# Patient Record
Sex: FEMALE | Race: NATIVE HAWAIIAN OR OTHER PACIFIC ISLANDER | Employment: FULL TIME | URBAN - METROPOLITAN AREA
[De-identification: names, ages, dates, MRNs, and addresses within clinical notes are randomized per-mention and may not be internally consistent; named-entity substitution may affect disease eponyms.]

---

## 2018-06-21 PROBLEM — R06.02 SOB (SHORTNESS OF BREATH): Status: ACTIVE | Noted: 2018-06-21

## 2018-06-21 PROBLEM — R00.2 PALPITATIONS: Status: ACTIVE | Noted: 2018-06-21

## 2018-06-21 PROBLEM — R42 DIZZY: Status: ACTIVE | Noted: 2018-06-21

## 2018-06-21 PROBLEM — D50.9 IRON DEFICIENCY ANEMIA: Status: ACTIVE | Noted: 2018-06-21

## 2020-04-16 PROBLEM — Z90.49 S/P LAPAROSCOPIC CHOLECYSTECTOMY: Status: ACTIVE | Noted: 2020-04-16

## 2020-04-16 PROBLEM — K81.0 ACUTE CHOLECYSTITIS: Status: ACTIVE | Noted: 2020-04-16

## 2023-08-02 PROCEDURE — 99284 EMERGENCY DEPT VISIT MOD MDM: CPT

## 2023-08-02 RX ORDER — LISDEXAMFETAMINE DIMESYLATE 30 MG/1
30 CAPSULE ORAL EVERY MORNING
COMMUNITY
Start: 2023-05-26

## 2023-08-03 ENCOUNTER — APPOINTMENT (OUTPATIENT)
Dept: CT IMAGING | Age: 33
End: 2023-08-03
Attending: EMERGENCY MEDICINE
Payer: COMMERCIAL

## 2023-08-03 ENCOUNTER — HOSPITAL ENCOUNTER (EMERGENCY)
Age: 33
Discharge: HOME | End: 2023-08-03
Attending: EMERGENCY MEDICINE
Payer: COMMERCIAL

## 2023-08-03 VITALS
OXYGEN SATURATION: 98 % | RESPIRATION RATE: 14 BRPM | TEMPERATURE: 98 F | SYSTOLIC BLOOD PRESSURE: 104 MMHG | DIASTOLIC BLOOD PRESSURE: 76 MMHG | HEIGHT: 63 IN | HEART RATE: 68 BPM | WEIGHT: 132 LBS | BODY MASS INDEX: 23.39 KG/M2

## 2023-08-03 DIAGNOSIS — S09.90XA ACUTE HEAD INJURY, INITIAL ENCOUNTER: Primary | ICD-10-CM

## 2023-08-03 PROCEDURE — 70450 CT HEAD/BRAIN W/O DYE: CPT | Performed by: EMERGENCY MEDICINE

## 2023-08-03 RX ORDER — ONDANSETRON 4 MG/1
4 TABLET, ORALLY DISINTEGRATING ORAL EVERY 4 HOURS PRN
Qty: 10 TABLET | Refills: 0 | Status: SHIPPED | OUTPATIENT
Start: 2023-08-03 | End: 2023-08-10
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
## 2024-06-09 ENCOUNTER — HOSPITAL ENCOUNTER (EMERGENCY)
Age: 34
Discharge: HOME | End: 2024-06-09
Attending: EMERGENCY MEDICINE
Payer: MEDICAID

## 2024-06-09 ENCOUNTER — APPOINTMENT (OUTPATIENT)
Dept: GENERAL RADIOLOGY | Age: 34
End: 2024-06-09
Payer: MEDICAID

## 2024-06-09 VITALS
SYSTOLIC BLOOD PRESSURE: 110 MMHG | DIASTOLIC BLOOD PRESSURE: 73 MMHG | OXYGEN SATURATION: 96 % | TEMPERATURE: 98 F | BODY MASS INDEX: 25.16 KG/M2 | HEIGHT: 63 IN | WEIGHT: 142 LBS | RESPIRATION RATE: 16 BRPM | HEART RATE: 84 BPM

## 2024-06-09 DIAGNOSIS — S61.211A LACERATION OF LEFT INDEX FINGER WITHOUT FOREIGN BODY WITHOUT DAMAGE TO NAIL, INITIAL ENCOUNTER: Primary | ICD-10-CM

## 2024-06-09 PROCEDURE — 99283 EMERGENCY DEPT VISIT LOW MDM: CPT

## 2024-06-09 PROCEDURE — 12001 RPR S/N/AX/GEN/TRNK 2.5CM/<: CPT

## 2024-06-09 PROCEDURE — 73140 X-RAY EXAM OF FINGER(S): CPT | Performed by: EMERGENCY MEDICINE

## 2024-06-09 PROCEDURE — 90471 IMMUNIZATION ADMIN: CPT

## 2024-06-10 NOTE — DISCHARGE INSTRUCTIONS
Laceration:     You may take ibuprofen 600 mg every 6 hours as needed for pain.  You may take Tylenol 1000 mg every 6 hours as needed for pain.  Keep wound clean and dry. After the first 24 hours, wash the area daily with wet soap water then dab dry. You may apply an antibiotic ointment such as Neosporin or bacitracin to the area and keep covered anytime that it may become contaminated.  Sutures should be removed in 7-10 days.  Follow with your primary care physician.  Return to the emergency room if you develop drainage from the wound, worsening redness pain or swelling at the site which extends up the extremity, fever over 103 or other new or worsened symptoms.

## 2024-06-12 NOTE — ED PROVIDER NOTES
Care was provided by nurse practitioner.  Reviewed the wound.  Agree with the history physical assessment and plan reviewed the sutures reviewed wound dressing I agree with the history physical assessment and plan of the nurse practitioner sutures will need to be removed in 10 to 14 days.

## 2024-06-14 NOTE — ED PROVIDER NOTES
Patient Seen in: [facility] [place]      History     Chief Complaint   Patient presents with    Laceration/Abrasion     Stated Complaint: finger lac    Subjective:   HPI    Pt is a 34yr old female who is here after cutting her left index finger with a bush curtis just pta.  Pt has full rom in the digit.  Pt's last tdap was 6yrs ago     Objective:   Past Medical History:    Anemia    Anxiety              History reviewed. No pertinent surgical history.             Social History     Socioeconomic History    Marital status: Single   Tobacco Use    Smoking status: Never     Passive exposure: Never    Smokeless tobacco: Never   Vaping Use    Vaping status: Never Used   Substance and Sexual Activity    Alcohol use: No    Drug use: No              Review of Systems    Positive for stated complaint: finger lac  Other systems are as noted in HPI.  Constitutional and vital signs reviewed.      All other systems reviewed and negative except as noted above.    Physical Exam     ED Triage Vitals [06/09/24 1843]   /73   Pulse 84   Resp 16   Temp 98.2 °F (36.8 °C)   Temp src Oral   SpO2 96 %   O2 Device        Current Vitals:   No data recorded        Physical Exam    VS: O2 saturation within normal limits for this patient.     General: Patient is awake and alert, oriented to person, place and time. Not in acute distress.     HEENT: Head is normocephalic, atraumatic.     Lungs: no respiratory distress noted    Skin: No rash noted.  No ecchymosis. Pt has a small laceration to the middle of the 4th digit L hand.     CNS: Interacts appropriately.   No gait abnormality noted.         ED Course   Labs Reviewed - No data to display          I have personally  reviewed available prior medical records for any recent pertinent discharge summaries/testing. Patient/family updated on results and plan, a verbalized understanding and agreement with the plan.  I explained to the patient that emergent conditions  may arise and to go to the ER for new, worsening or any persistent conditions. I've explained the importance of taking all medicatons as prescribed, follow up, and return precuations,  All questions answered.    Please note that this report has been produced using speech recognition software and may contain errors related to that system including, but not limited to, errors in grammar, punctuation, and spelling, as well as words and phrases that possibly may have been recognized inappropriately.  If there are any questions or concerns, contact the dictating provider for clarification.         MDM      Patient presents for laceration.  Wound is clean, no foreign body identified.  Tdap was updated in the emergency room today.  Incision is superficial, was closed with sutures.  Patient was instructed on suture care.  There is good wound approximation and no bleeding noted after the procedure.  Oxygen saturation is 99% on room air which is normal for this patient.  Patient has no other physical complaints.  Patient was instructed on need for follow-up with PCP and return to ED precautions    Laceration repair:    Verbal consent was obtained from the patient.  The 0.75 cm laceration located to the middle of the left 4th digit was anesthetized in the usual fashion. The wound was scrubbed, draped and explored.   There were no deep structures involved.  No tendon injury was identified.  The wound was repaired with three  4.0 sutures  .  The wound repair was simple.  The procedure was performed by myself.                                     Medical Decision Making      Disposition and Plan     Clinical Impression:  1. Laceration of left index finger without foreign body without damage to nail, initial encounter         Disposition:  Discharge  6/9/2024  7:41 pm    Follow-up:  Edis Darby MD  [address] [place] [address] [id number] [phone]    Follow up            Medications Prescribed:  Discharge Medication List as  of 6/9/2024  7:52 PM